# Patient Record
Sex: FEMALE | Employment: PART TIME | URBAN - METROPOLITAN AREA
[De-identification: names, ages, dates, MRNs, and addresses within clinical notes are randomized per-mention and may not be internally consistent; named-entity substitution may affect disease eponyms.]

---

## 2017-02-01 ENCOUNTER — OFFICE VISIT (OUTPATIENT)
Dept: BEHAVIORAL/MENTAL HEALTH CLINIC | Age: 62
End: 2017-02-01

## 2017-02-01 VITALS
HEIGHT: 60 IN | OXYGEN SATURATION: 96 % | DIASTOLIC BLOOD PRESSURE: 60 MMHG | HEART RATE: 55 BPM | WEIGHT: 180 LBS | BODY MASS INDEX: 35.34 KG/M2 | SYSTOLIC BLOOD PRESSURE: 110 MMHG

## 2017-02-01 DIAGNOSIS — F31.61 BIPOLAR DISORDER, CURRENT EPISODE MIXED, MILD (HCC): Primary | ICD-10-CM

## 2017-02-01 DIAGNOSIS — F41.1 GENERALIZED ANXIETY DISORDER: ICD-10-CM

## 2017-02-01 RX ORDER — LAMOTRIGINE 100 MG/1
TABLET ORAL
Qty: 30 TAB | Refills: 5 | Status: SHIPPED | OUTPATIENT
Start: 2017-02-01 | End: 2017-02-01 | Stop reason: SDUPTHER

## 2017-02-01 RX ORDER — SERTRALINE HYDROCHLORIDE 100 MG/1
100 TABLET, FILM COATED ORAL DAILY
Qty: 90 TAB | Refills: 1 | Status: SHIPPED | OUTPATIENT
Start: 2017-02-01 | End: 2017-08-02 | Stop reason: SDUPTHER

## 2017-02-01 RX ORDER — SERTRALINE HYDROCHLORIDE 100 MG/1
100 TABLET, FILM COATED ORAL DAILY
Qty: 30 TAB | Refills: 5 | Status: SHIPPED | OUTPATIENT
Start: 2017-02-01 | End: 2017-02-01 | Stop reason: SDUPTHER

## 2017-02-01 RX ORDER — LAMOTRIGINE 100 MG/1
TABLET ORAL
Qty: 90 TAB | Refills: 1 | Status: SHIPPED | OUTPATIENT
Start: 2017-02-01 | End: 2017-08-02 | Stop reason: SDUPTHER

## 2017-02-01 NOTE — PROGRESS NOTES
CHIEF COMPLAINT:  Abhinav Dalal is a 62 y.o. female and was seen today for follow-up of psychiatric condition and psychotropic medication management. HPI:     Abhinav Dalal is a 62 y.o. yo female who presents with symptoms of depression, anxiety and hypomania. She was originally diagnosed with BPAD and KATHLEEN but was on a med regimen that caused sig emotional blunting. Her Zoloft dose of 100mg every day was continued and her Lamictal dose was gradually decreased to 100mg every day, on which she has done well. FAMILY/SOCIAL HX: ***    REVIEW OF SYSTEMS:  Psychiatric:  {psych:43629::\"normal\"}  Appetite:{Psychappetite:26676}   Sleep: {Psychsleep:25159}   Neuro: ***    There were no vitals taken for this visit. Side Effects:  {Side effects:46645}    MENTAL STATUS EXAM:   Sensorium  {Sensorium:81050}   Relations {RELATIONS:50740261}   Appearance:  {APPEARANCE:43240::\"age appropriate\"}   Motor Behavior:  {MOTOR BEHAVIOR:35189955::\"within normal limits\"}   Speech:  {Findings; speech psych:97995}   Thought Process: {Psych thought process:65750}   Thought Content {thought content:83705}   Suicidal ideations {PLAN/INTENTION:31448423}   Homicidal ideations {PLAN/INTENTION:02987277}   Mood:  {Mood:00610}   Affect:  {Desc; affect:67298}   Memory recent  {MEMORY:05535586}   Memory remote:  {MEMORY:65933225}   Concentration:  {MEMORY:16011453}   Abstraction:  {ABSTRACTION:38020450}   Insight:  {Psych insight & judgement:40314}   Reliability {Reliability:44880}   Judgment:  {Psych insight & judgement:15935}     MEDICAL DECISION MAKING:  Problems addressed today:  {No Diagnosis Found}    Assessment:   Hallie Perez {AMB IS/IS UVZ:16373} responding to treatment, symptoms are ***. Her father came in from out of town and her roommate went to [place] unexpectedly. 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 If she gets it then she will only work 1 job.          Current Outpatient Prescriptions Medication Sig Dispense Refill    lamoTRIgine (LAMICTAL) 100 mg tablet TAKE ONE TABLET BY MOUTH ONCE DAILY 30 Tab 2    sertraline (ZOLOFT) 100 mg tablet Take 1 Tab by mouth daily. 30 Tab 2    MAGNESIUM CITRATE Take 1 Tab by mouth daily.  atenolol (TENORMIN) 100 mg tablet Take 100 mg by mouth daily.  LEVOTHYROXINE SODIUM (SYNTHROID PO) Take 50 mcg by mouth daily.  CYANOCOBALAMIN, VITAMIN B-12, (VITAMIN B-12 SL) by SubLINGual route.  cholecalciferol, vitamin D3, 2,000 unit tab Take 2,000 Units by mouth daily.  ASCORBATE CALCIUM (VITAMIN C PO) Take 1 Tab by mouth daily.  multivitamin (ONE A DAY) tablet Take 1 Tab by mouth daily.  BIOTIN PO Take 1 Tab by mouth daily. Plan:   1. Medications/ Labs: Continue Lamictal 100mg every day for mood stability. Continue Zoloft 100mg every day for depression. Rxs provided. 2.  Counseling and coordination of care including instructions for treatment, risks/benefits, risk factor reduction and patient/family education. She agrees with the plan. Patient instructed to call with any side effects, questions or issues.      3.  Follow-up Disposition: Not on File    2/1/2017  Mac Verma, FLAVIO

## 2017-02-01 NOTE — PROGRESS NOTES
CHIEF COMPLAINT:  Leyda Joe is a 62 y.o. female and was seen today for follow-up of psychiatric condition and psychotropic medication management. HPI:     Leyda Joe is a 62 y.o. yo female who presents with symptoms of depression, anxiety and hypomania. She was originally diagnosed with BPAD and KATHLEEN but was on a med regimen that caused sig emotional blunting. Her Zoloft dose of 100mg every day was continued and her Lamictal dose was gradually decreased to 100mg every day, on which she has done well. Visit Vitals    /60 (BP 1 Location: Left arm, BP Patient Position: Sitting)    Pulse (!) 55    Ht 5' (1.524 m)    Wt 81.6 kg (180 lb)    SpO2 96%    BMI 35.15 kg/m2       REVIEW OF SYSTEMS:  Psychiatric:  normal  Appetite:good, weight increased by 2 lbs   Sleep: good     Side Effects:  none    MENTAL STATUS EXAM:   Sensorium  oriented to time, place and person   Relations cooperative   Appearance:  age appropriate and casually dressed   Motor Behavior:  gait stable and within normal limits   Speech:  normal pitch, normal volume and non-pressured   Thought Process: goal directed and logical   Thought Content free of delusions, free of hallucinations and not internally preoccupied    Suicidal ideations none   Homicidal ideations none   Mood:  euthymic   Affect:  euthymic   Memory recent  adequate   Memory remote:  adequate   Concentration:  adequate   Abstraction:  abstract   Insight:  good   Reliability good   Judgment:  good     MEDICAL DECISION MAKING:  Problems addressed today:    ICD-10-CM ICD-9-CM    1. Bipolar disorder, current episode mixed, mild (HCC) F31.61 296.61    2. Generalized anxiety disorder F41.1 300.02        Assessment:   Bre Wilson is responding to treatment, symptoms are stable. Moods and anxiety are stable. Her father came in from out of town and her roommate went to [place] unexpectedly.  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 If she gets it then she will only work this 1 job as the pay is more and it is benefitted. She reports stable moods and anxiety. She is maintaining her friendships. No new issues. Current Outpatient Prescriptions   Medication Sig Dispense Refill    L-TYROSINE by Does Not Apply route.  sertraline (ZOLOFT) 100 mg tablet Take 1 Tab by mouth daily. 90 Tab 1    lamoTRIgine (LAMICTAL) 100 mg tablet TAKE ONE TABLET BY MOUTH ONCE DAILY 90 Tab 1    MAGNESIUM CITRATE Take 1 Tab by mouth daily.  atenolol (TENORMIN) 100 mg tablet Take 100 mg by mouth daily.  CYANOCOBALAMIN, VITAMIN B-12, (VITAMIN B-12 SL) by SubLINGual route.  cholecalciferol, vitamin D3, 2,000 unit tab Take 2,000 Units by mouth daily.  multivitamin (ONE A DAY) tablet Take 1 Tab by mouth daily.  BIOTIN PO Take 1 Tab by mouth daily. Plan:   1. Medications/Labs: Continue Lamictal 100mg every day for mood stability. Continue Zoloft 100mg every day for depression. Rxs provided. 2.  Counseling and coordination of care including instructions for treatment, risks/benefits, risk factor reduction and patient/family education. She agrees with the plan. Patient instructed to call with any side effects, questions or issues. 3.  Follow-up Disposition:  Return in about 6 months (around 8/1/2017).     Hayden Sylvester NP  2/1/2017

## 2017-03-21 ENCOUNTER — HOSPITAL ENCOUNTER (EMERGENCY)
Age: 62
Discharge: HOME | End: 2017-03-21
Attending: FAMILY MEDICINE

## 2017-03-21 VITALS
RESPIRATION RATE: 16 BRPM | DIASTOLIC BLOOD PRESSURE: 73 MMHG | BODY MASS INDEX: 33.61 KG/M2 | HEIGHT: 61 IN | SYSTOLIC BLOOD PRESSURE: 126 MMHG | HEART RATE: 73 BPM | TEMPERATURE: 98.7 F | OXYGEN SATURATION: 96 % | WEIGHT: 178 LBS

## 2017-03-21 DIAGNOSIS — J06.9 ACUTE UPPER RESPIRATORY INFECTION: Primary | ICD-10-CM

## 2017-03-21 RX ORDER — CODEINE PHOSPHATE AND GUAIFENESIN 10; 100 MG/5ML; MG/5ML
5 SOLUTION ORAL
Qty: 120 ML | Refills: 0 | Status: SHIPPED | OUTPATIENT
Start: 2017-03-21 | End: 2018-03-02 | Stop reason: ALTCHOICE

## 2017-03-21 NOTE — UC PROVIDER NOTE
Patient is a 62 y.o. female presenting with cough and nasal congestion. The history is provided by the patient. Cough   This is a new problem. Episode onset: Four days ago. The problem occurs every few minutes. The problem has not changed since onset. The cough is productive of sputum. There has been no fever. Associated symptoms include rhinorrhea. Pertinent negatives include no chest pain, no chills, no sweats, no headaches and no myalgias. She has tried antibiotics for the symptoms. The treatment provided no relief. She is not a smoker. Her past medical history does not include pneumonia or asthma. Nasal Congestion   This is a new problem. The current episode started more than 2 days ago. The problem occurs daily. The problem has not changed since onset. Pertinent negatives include no chest pain and no headaches. Nothing aggravates the symptoms. Nothing relieves the symptoms. Treatments tried: Antibiotics. Past Medical History:   Diagnosis Date    Depression     GERD (gastroesophageal reflux disease)     Hypercholesterolemia     Hypertension     Thyroid disease         History reviewed. No pertinent surgical history. History reviewed. No pertinent family history. Social History     Social History    Marital status: UNKNOWN     Spouse name: N/A    Number of children: N/A    Years of education: N/A     Occupational History    Not on file. Social History Main Topics    Smoking status: Never Smoker    Smokeless tobacco: Never Used    Alcohol use Yes      Comment: once a year    Drug use: No    Sexual activity: Not on file     Other Topics Concern    Not on file     Social History Narrative                ALLERGIES: Review of patient's allergies indicates no known allergies. Review of Systems   Constitutional: Negative for chills and fever. HENT: Positive for congestion and rhinorrhea. Respiratory: Positive for cough. Cardiovascular: Negative for chest pain. Musculoskeletal: Negative for myalgias. Neurological: Negative for headaches. Vitals:    03/21/17 1318   BP: 126/73   Pulse: 73   Resp: 16   Temp: 98.7 °F (37.1 °C)   SpO2: 96%   Weight: 80.7 kg (178 lb)   Height: 5' 1\" (1.549 m)       Physical Exam   Constitutional: She is oriented to person, place, and time. She appears well-developed and well-nourished. HENT:   Right Ear: External ear normal.   Left Ear: External ear normal.   Eyes: EOM are normal.   Neck: Normal range of motion. Neck supple. No JVD present. No tracheal deviation present. No thyromegaly present. Cardiovascular: Normal rate and regular rhythm. Pulmonary/Chest: Effort normal and breath sounds normal.   Abdominal: Soft. Bowel sounds are normal.   Musculoskeletal: Normal range of motion. Lymphadenopathy:     She has no cervical adenopathy. Neurological: She is alert and oriented to person, place, and time. Skin: Skin is warm and dry. Psychiatric: She has a normal mood and affect. Her behavior is normal. Judgment and thought content normal.   Nursing note and vitals reviewed. MDM     Differential Diagnosis; Clinical Impression; Plan:     CLINICAL IMPRESSION:  Acute upper respiratory infection  (primary encounter diagnosis)    Plan:  1. Robitussin AC  2.   3.   Risk of Significant Complications, Morbidity, and/or Mortality:   Presenting problems: Moderate  Diagnostic procedures: Moderate  Management options:   Moderate  Progress:   Patient progress:  Stable      Procedures

## 2017-03-21 NOTE — LETTER
[facility] 
[address] [address] 
[phone] Work/School Note Date: 3/21/2017 To Whom It May concern: 
 
Danna Leger was seen and treated today in the urgent care center by the following provider(s): 
Physician Assistant: Zack Gasca. Danna Leger may return to work on 3/21-3/24/17.  
 
Sincerely, 
 
 
 
 
Jamey Peck RN

## 2017-03-21 NOTE — DISCHARGE INSTRUCTIONS

## 2017-08-02 ENCOUNTER — OFFICE VISIT (OUTPATIENT)
Dept: BEHAVIORAL/MENTAL HEALTH CLINIC | Age: 62
End: 2017-08-02

## 2017-08-02 VITALS
BODY MASS INDEX: 33.42 KG/M2 | OXYGEN SATURATION: 98 % | HEART RATE: 56 BPM | SYSTOLIC BLOOD PRESSURE: 145 MMHG | HEIGHT: 61 IN | WEIGHT: 177 LBS | DIASTOLIC BLOOD PRESSURE: 77 MMHG

## 2017-08-02 DIAGNOSIS — F31.61 BIPOLAR DISORDER, CURRENT EPISODE MIXED, MILD (HCC): Primary | ICD-10-CM

## 2017-08-02 DIAGNOSIS — F41.1 GENERALIZED ANXIETY DISORDER: ICD-10-CM

## 2017-08-02 RX ORDER — SERTRALINE HYDROCHLORIDE 100 MG/1
100 TABLET, FILM COATED ORAL DAILY
Qty: 90 TAB | Refills: 1 | Status: SHIPPED | OUTPATIENT
Start: 2017-08-02 | End: 2018-03-02 | Stop reason: SDUPTHER

## 2017-08-02 RX ORDER — LAMOTRIGINE 100 MG/1
TABLET ORAL
Qty: 90 TAB | Refills: 1 | Status: SHIPPED | OUTPATIENT
Start: 2017-08-02 | End: 2018-03-02 | Stop reason: SDUPTHER

## 2017-08-02 NOTE — PROGRESS NOTES
CHIEF COMPLAINT:  Flaquito Vines is a 62 y.o. female and was seen today for follow-up of psychiatric condition and psychotropic medication management. HPI:    Flaquito Vines is a 62 y.o. yo female who presents with symptoms of depression, anxiety and hypomania. She was originally diagnosed with BPAD and KATLHEEN but was on a med regimen that caused sig emotional blunting. Her Zoloft dose of 100mg every day was continued and her Lamictal dose was gradually decreased to 100mg every day, on which she has done well. FAMILY/SOCIAL HX: lives with her roommate, works for [employer] and at [employer]	[place], has several dogs     REVIEW OF SYSTEMS:  Psychiatric:  normal  Appetite:no change from normal, weight decreased by 3 lbs   Sleep: good   Neuro: none reported     Visit Vitals    /77 (BP 1 Location: Left arm, BP Patient Position: Sitting)    Pulse (!) 56    Ht 5' 1\" (1.549 m)    Wt 80.3 kg (177 lb)    SpO2 98%    BMI 33.44 kg/m2       Side Effects:  none    MENTAL STATUS EXAM:   Sensorium  oriented to time, place and person   Relations cooperative   Appearance:  age appropriate and casually dressed   Motor Behavior:  gait stable and within normal limits   Speech:  normal pitch, normal volume and non-pressured   Thought Process: goal directed and logical   Thought Content free of delusions, free of hallucinations and not internally preoccupied    Suicidal ideations none   Homicidal ideations none   Mood:  euthymic   Affect:  euthymic   Memory recent  adequate   Memory remote:  adequate   Concentration:  adequate   Abstraction:  abstract   Insight:  good   Reliability good   Judgment:  good     MEDICAL DECISION MAKING:  Problems addressed today:    ICD-10-CM ICD-9-CM    1. Bipolar disorder, current episode mixed, mild ([facility]) F31.61 296.61    2. Generalized anxiety disorder F41.1 300.02        Assessment:   Edward Funk is responding to treatment, symptoms are stable.  Her aunt passed in [place] and a great-uncle passed. She reports that she is \"doing ok. \" She is working at [employer]. 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 Moods are stable with her current med regimen. She reports that she still has anger \"but it is like anyone else. \" She got a new pit bull mix and he is doing well. Current Outpatient Prescriptions   Medication Sig Dispense Refill    sertraline (ZOLOFT) 100 mg tablet Take 1 Tab by mouth daily. 90 Tab 1    lamoTRIgine (LAMICTAL) 100 mg tablet TAKE ONE TABLET BY MOUTH ONCE DAILY 90 Tab 1    guaiFENesin-codeine (ROBITUSSIN AC) 100-10 mg/5 mL solution Take 5 mL by mouth three (3) times daily as needed for Cough. Max Daily Amount: 15 mL. 120 mL 0    L-TYROSINE by Does Not Apply route.  MAGNESIUM CITRATE Take 1 Tab by mouth daily.  atenolol (TENORMIN) 100 mg tablet Take 100 mg by mouth daily.  CYANOCOBALAMIN, VITAMIN B-12, (VITAMIN B-12 SL) by SubLINGual route.  cholecalciferol, vitamin D3, 2,000 unit tab Take 2,000 Units by mouth daily.  multivitamin (ONE A DAY) tablet Take 1 Tab by mouth daily.  BIOTIN PO Take 1 Tab by mouth daily. Plan:   1. Medications/ Labs: Continue Lamictal 100mg every day for mood stability. Continue Zoloft 100mg every day for depression. Rxs provided. 2.  Counseling and coordination of care including instructions for treatment, risks/benefits, risk factor reduction and patient/family education. She agrees with the plan. Patient instructed to call with any side effects, questions or issues.      3.  Follow-up Disposition:  Return in about 6 months (around 2/2/2018).    8/2/2017  Bridger Minaya NP

## 2017-08-07 RX ORDER — SERTRALINE HYDROCHLORIDE 100 MG/1
TABLET, FILM COATED ORAL
Qty: 90 TAB | Refills: 1 | OUTPATIENT
Start: 2017-08-07

## 2018-03-02 ENCOUNTER — OFFICE VISIT (OUTPATIENT)
Dept: BEHAVIORAL/MENTAL HEALTH CLINIC | Age: 63
End: 2018-03-02

## 2018-03-02 VITALS
HEIGHT: 61 IN | HEART RATE: 57 BPM | SYSTOLIC BLOOD PRESSURE: 144 MMHG | BODY MASS INDEX: 34.36 KG/M2 | WEIGHT: 182 LBS | DIASTOLIC BLOOD PRESSURE: 73 MMHG

## 2018-03-02 DIAGNOSIS — F41.1 GENERALIZED ANXIETY DISORDER: ICD-10-CM

## 2018-03-02 DIAGNOSIS — F31.61 BIPOLAR DISORDER, CURRENT EPISODE MIXED, MILD (HCC): Primary | ICD-10-CM

## 2018-03-02 RX ORDER — LAMOTRIGINE 100 MG/1
TABLET ORAL
Qty: 90 TAB | Refills: 1 | Status: SHIPPED | OUTPATIENT
Start: 2018-03-02 | End: 2018-08-26 | Stop reason: SDUPTHER

## 2018-03-02 RX ORDER — LORAZEPAM 1 MG/1
TABLET ORAL
Qty: 30 TAB | Refills: 2 | Status: SHIPPED | OUTPATIENT
Start: 2018-03-02 | End: 2019-04-03 | Stop reason: SDUPTHER

## 2018-03-02 RX ORDER — ATENOLOL 50 MG/1
TABLET ORAL
Refills: 0 | COMMUNITY
Start: 2018-02-15

## 2018-03-02 RX ORDER — SERTRALINE HYDROCHLORIDE 100 MG/1
100 TABLET, FILM COATED ORAL DAILY
Qty: 90 TAB | Refills: 1 | Status: SHIPPED | OUTPATIENT
Start: 2018-03-02 | End: 2018-08-26 | Stop reason: SDUPTHER

## 2018-03-02 NOTE — PROGRESS NOTES
CHIEF COMPLAINT:  Julieth Nina is a 65 y.o. female and was seen today for follow-up of psychiatric condition and psychotropic medication management. Last appt was August 2017. HPI:    Julieth Nina is a 65 y.o. female who presents with symptoms of depression, anxiety and hypomania. She was originally diagnosed with BPAD and KATHLEEN but was on a med regimen that caused sig emotional blunting. Her Zoloft dose of 100mg every day was continued and her Lamictal dose was gradually decreased to 100mg every day, on which she has done well. FAMILY/SOCIAL HX: lives with her roommate, works for [employer] and at  Home	[employer], has several dogs     REVIEW OF SYSTEMS:  Psychiatric:  normal  Appetite:no change from normal, weight increased by 5lbs   Sleep: good   Neuro: none reported     Visit Vitals    /73    Pulse (!) 57    Ht 5' 1\" (1.549 m)    Wt 82.6 kg (182 lb)    BMI 34.39 kg/m2       Side Effects:  none    MENTAL STATUS EXAM:   Sensorium  oriented to time, place and person   Relations cooperative   Appearance:  age appropriate and casually dressed   Motor Behavior:  gait stable and within normal limits   Speech:  normal pitch, normal volume and non-pressured   Thought Process: goal directed and logical   Thought Content free of delusions, free of hallucinations and not internally preoccupied    Suicidal ideations none   Homicidal ideations none   Mood:  euthymic   Affect:  euthymic   Memory recent  adequate   Memory remote:  adequate   Concentration:  adequate   Abstraction:  abstract   Insight:  good   Reliability good   Judgment:  good     MEDICAL DECISION MAKING:  Problems addressed today:    ICD-10-CM ICD-9-CM    1. Bipolar disorder, current episode mixed, mild ([facility]) F31.61 296.61    2. Generalized anxiety disorder F41.1 300.02 LORazepam (ATIVAN) 1 mg tablet       Assessment:   Adolfo Senior is responding to treatment, symptoms are stable. She denies any new medical issues.  She will have to give up her dog as he was having agressive issues. She had an episode of depression in February when she isolated from others, called into work, and felt more depressed. She is doing better now. She is working at [employer]. Moods are generally stable with her current med regimen. She lost her insurance. She has noted a resurgence in her anxiety. She has a remote history of significant anxiety but this has improved. Now she notes infrequent episodes of intense anxiety with cold, clammy feeling. Ativan was helpful previously. She does not take narcotic pain medications. Current Outpatient Prescriptions   Medication Sig Dispense Refill    atenolol (TENORMIN) 50 mg tablet TAKE 2 TABLETS BY MOUTH EVERY DAY  0    lamoTRIgine (LAMICTAL) 100 mg tablet TAKE ONE TABLET BY MOUTH ONCE DAILY 90 Tab 1    sertraline (ZOLOFT) 100 mg tablet Take 1 Tab by mouth daily. 90 Tab 1    LORazepam (ATIVAN) 1 mg tablet Take 1/2 to 1 whole tablet daily PRN anxiety. 30 Tab 2    L-TYROSINE by Does Not Apply route.  CYANOCOBALAMIN, VITAMIN B-12, (VITAMIN B-12 SL) by SubLINGual route.  cholecalciferol, vitamin D3, 2,000 unit tab Take 2,000 Units by mouth daily.  multivitamin (ONE A DAY) tablet Take 1 Tab by mouth daily.  MAGNESIUM CITRATE Take 1 Tab by mouth daily.  BIOTIN PO Take 1 Tab by mouth daily. Plan:   1. Medications/ Labs: Continue Lamictal 100mg every day for mood stability. Continue Zoloft 100mg every day for depression. Start Ativan 0.5-1mg every day PRN severe anxiety. She agrees to use this sparingly. Risks/ benefits reviewed with her. Rxs provided. 2.  Counseling and coordination of care including instructions for treatment, risks/benefits, risk factor reduction and patient/family education. She agrees with the plan. Patient instructed to call with any side effects, questions or issues. 3.  Follow-up Disposition:  Return in about 6 months (around 9/2/2018).     3/2/2018  [facility] Jaime Queen, NP

## 2018-03-02 NOTE — MR AVS SNAPSHOT
[address] 
 
 
 [address] 
[phone] Patient: Brit Lackey MRN: [id number] ZULAY:[id number] Visit Information Date & Time Provider Department Dept. Phone Encounter #  
 3/2/2018 12:00 PM [facility] [phone] Your Appointments 9/5/2018  4:00 PM  
ESTABLISHED PATIENT with Herbert Chavarria NP [facility] ([facility]) Appt Note: 6 month follow-up [address] [facility] [address]  
  
   
 [address] [facility] [phone] Upcoming Health Maintenance Date Due Hepatitis C Screening 1955 DTaP/Tdap/Td series (1 - Tdap) 11/10/1977 PAP AKA CERVICAL CYTOLOGY 11/10/1977 BREAST CANCER SCRN MAMMOGRAM 11/10/2006 FOBT Q 1 YEAR AGE 50-75 11/10/2006 ZOSTER VACCINE AGE 60> 9/10/2016 Influenza Age 5 to Adult 8/1/2017 Allergies as of 3/2/2018  Review Complete On: 3/2/2018 By: Facundo Nuñez No Known Allergies Current Immunizations  Never Reviewed No immunizations on file. Not reviewed this visit You Were Diagnosed With   
  
 Codes Comments Bipolar disorder, current episode mixed, mild ([facility] [address]    -  Primary ICD-10-CM: F31.61 
ICD-9-CM: 296.61 Generalized anxiety disorder     ICD-10-CM: F41.1 ICD-9-CM: 300.02 Vitals BP Pulse Height(growth percentile) Weight(growth percentile) BMI OB Status 144/73 (!) 57 5' 1\" (1.549 m) 182 lb (82.6 kg) 34.39 kg/m2 Postmenopausal  
 Smoking Status Never Smoker Vitals History BMI and BSA Data Body Mass Index Body Surface Area  
 34.39 kg/m 2 1.89 m 2 Preferred Pharmacy Pharmacy Name Phone [id number] [address] [place] [address] [phone] Your Updated Medication List  
  
   
 This list is accurate as of 3/2/18 12:44 PM.  Always use your most recent med list.  
  
  
  
  
 atenolol 50 mg tablet Commonly known as:  TENORMIN  
TAKE 2 TABLETS BY MOUTH EVERY DAY  
  
 BIOTIN PO Take 1 Tab by mouth daily. cholecalciferol (vitamin D3) 2,000 unit Tab Take 2,000 Units by mouth daily. L-TYROSINE  
by Does Not Apply route. lamoTRIgine 100 mg tablet Commonly known as: LaMICtal  
TAKE ONE TABLET BY MOUTH ONCE DAILY LORazepam 1 mg tablet Commonly known as:  ATIVAN Take 1/2 to 1 whole tablet daily PRN anxiety. MAGNESIUM CITRATE Take 1 Tab by mouth daily. multivitamin tablet Commonly known as:  ONE A DAY Take 1 Tab by mouth daily. sertraline 100 mg tablet Commonly known as:  ZOLOFT Take 1 Tab by mouth daily. VITAMIN B-12 SL  
by SubLINGual route. Prescriptions Printed Refills  
 lamoTRIgine (LAMICTAL) 100 mg tablet 1 Sig: TAKE ONE TABLET BY MOUTH ONCE DAILY Class: Print  
 sertraline (ZOLOFT) 100 mg tablet 1 Sig: Take 1 Tab by mouth daily. Class: Print Route: Oral  
 LORazepam (ATIVAN) 1 mg tablet 2 Sig: Take 1/2 to 1 whole tablet daily PRN anxiety. Class: Print Introducing [facility]! Dru Lamb introduces [employer] System patient portal. Now you can access parts of your medical record, email your doctor's office, and request medication refills online. 1. In your internet browser, go to [email or web address] 2. Click on the First Time User? Click Here link in the Sign In box. You will see the New Member Sign Up page. 3. Enter your [employer] System Access Code exactly as it appears below. You will not need to use this code after youve completed the sign-up process. If you do not sign up before the expiration date, you must request a new code. · [employer] Print System Access Code: [email or web address] Expires: 5/31/2018 12:44 PM 
 
 4. Enter the last four digits of your Social Security Number (xxxx) and Date of Birth (mm/dd/yyyy) as indicated and click Submit. You will be taken to the next sign-up page. 5. Create a [employer] ID. This will be your [employer] login ID and cannot be changed, so think of one that is secure and easy to remember. 6. Create a [employer] password. You can change your password at any time. 7. Enter your Password Reset Question and Answer. This can be used at a later time if you forget your password. 8. Enter your e-mail address. You will receive e-mail notification when new information is available in [address] 9. Click Sign Up. You can now view and download portions of your medical record. 10. Click the Download Summary menu link to download a portable copy of your medical information. If you have questions, please visit the Frequently Asked Questions section of the [employer] website. Remember, [employer] is NOT to be used for urgent needs. For medical emergencies, dial 911. Now available from your iPhone and Android! Please provide this summary of care documentation to your next provider. Your primary care clinician is listed as Wen Hubbard. If you have any questions after today's visit, please call [phone].

## 2018-08-27 RX ORDER — SERTRALINE HYDROCHLORIDE 100 MG/1
TABLET, FILM COATED ORAL
Qty: 90 TAB | Refills: 0 | Status: SHIPPED | OUTPATIENT
Start: 2018-08-27 | End: 2018-09-05 | Stop reason: SDUPTHER

## 2018-08-27 RX ORDER — LAMOTRIGINE 100 MG/1
TABLET ORAL
Qty: 90 TAB | Refills: 0 | Status: SHIPPED | OUTPATIENT
Start: 2018-08-27 | End: 2018-09-05 | Stop reason: SDUPTHER

## 2018-09-05 ENCOUNTER — OFFICE VISIT (OUTPATIENT)
Dept: BEHAVIORAL/MENTAL HEALTH CLINIC | Age: 63
End: 2018-09-05

## 2018-09-05 VITALS
HEIGHT: 61 IN | WEIGHT: 180.2 LBS | BODY MASS INDEX: 34.02 KG/M2 | OXYGEN SATURATION: 97 % | HEART RATE: 62 BPM | SYSTOLIC BLOOD PRESSURE: 130 MMHG | RESPIRATION RATE: 16 BRPM | TEMPERATURE: 98.5 F | DIASTOLIC BLOOD PRESSURE: 65 MMHG

## 2018-09-05 DIAGNOSIS — F41.1 GENERALIZED ANXIETY DISORDER: ICD-10-CM

## 2018-09-05 DIAGNOSIS — F31.61 BIPOLAR DISORDER, CURRENT EPISODE MIXED, MILD (HCC): Primary | ICD-10-CM

## 2018-09-05 RX ORDER — LAMOTRIGINE 100 MG/1
TABLET ORAL
Qty: 90 TAB | Refills: 1 | Status: SHIPPED | OUTPATIENT
Start: 2018-09-05 | End: 2019-04-03 | Stop reason: SDUPTHER

## 2018-09-05 RX ORDER — SERTRALINE HYDROCHLORIDE 100 MG/1
TABLET, FILM COATED ORAL
Qty: 90 TAB | Refills: 1 | Status: SHIPPED | OUTPATIENT
Start: 2018-09-05 | End: 2019-04-03 | Stop reason: SDUPTHER

## 2018-09-05 NOTE — PROGRESS NOTES
CHIEF COMPLAINT:  Day Case is a 65 y.o. female and was seen today for follow-up of psychiatric condition and psychotropic medication management. Last appt was March 2017. HPI:    Day Case is a 65 y.o. female who presents with symptoms of depression, anxiety and hypomania. She was originally diagnosed with BPAD and KATHLEEN but was on a med regimen that caused sig emotional blunting. Her Zoloft dose of 100mg every day was continued and her Lamictal dose was gradually decreased to 100mg every day, on which she has done well. FAMILY/SOCIAL HX: lives with her roommate, works for [employer] and at  Home	[place], has several dogs     REVIEW OF SYSTEMS:  Psychiatric:  normal  Appetite: good, weight decreased by 2lbs   Sleep: good   Neuro: none reported     Visit Vitals    /65 (BP 1 Location: Left arm, BP Patient Position: Sitting)    Pulse 62    Temp 98.5 °F (36.9 °C) (Oral)    Resp 16    Ht 5' 1\" (1.549 m)    Wt 81.7 kg (180 lb 3.2 oz)    SpO2 97%    BMI 34.05 kg/m2       Side Effects:  none    MENTAL STATUS EXAM:   Sensorium  oriented to time, place and person   Relations cooperative   Appearance:  age appropriate and casually dressed   Motor Behavior:  gait stable and within normal limits   Speech:  normal pitch, normal volume and non-pressured   Thought Process: goal directed and logical   Thought Content free of delusions, free of hallucinations and not internally preoccupied    Suicidal ideations none   Homicidal ideations none   Mood:  euthymic   Affect:  euthymic   Memory recent  adequate   Memory remote:  adequate   Concentration:  adequate   Abstraction:  abstract   Insight:  good   Reliability good   Judgment:  good     MEDICAL DECISION MAKING:  Problems addressed today:    ICD-10-CM ICD-9-CM    1. Bipolar disorder, current episode mixed, mild (HCC) F31.61 296.61    2.  Generalized anxiety disorder F41.1 300.02        Assessment:   Traci Hough is responding to treatment, symptoms are stable. She denies any new medical issues. She is enjoying her job at [employer] and is also working 3 days a week at the [employer]. 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 Ativan is helping her panic symptoms- she rarely uses this but it works when she needs it to. Moods are still generally stable. Her friends recently lost their dog suddenly and so that was hard. Nat Nguyen has had some other stressors- taking a few dogs to the emergency vets. She started taking James Chi again every week. This is helpful for her anxiety. Overall, doing well and is stable. Current Outpatient Prescriptions   Medication Sig Dispense Refill    sertraline (ZOLOFT) 100 mg tablet TAKE 1 TABLET BY MOUTH EVERY DAY 90 Tab 1    lamoTRIgine (LAMICTAL) 100 mg tablet 1T;PO;QD; 90 Tab 1    atenolol (TENORMIN) 50 mg tablet TAKE 2 TABLETS BY MOUTH EVERY DAY  0    LORazepam (ATIVAN) 1 mg tablet Take 1/2 to 1 whole tablet daily PRN anxiety. 30 Tab 2    L-TYROSINE by Does Not Apply route.  cholecalciferol, vitamin D3, 2,000 unit tab Take 2,000 Units by mouth daily.  multivitamin (ONE A DAY) tablet Take 1 Tab by mouth daily.  BIOTIN PO Take 1 Tab by mouth daily.  MAGNESIUM CITRATE Take 1 Tab by mouth daily.  CYANOCOBALAMIN, VITAMIN B-12, (VITAMIN B-12 SL) by SubLINGual route. Plan:   1. Medications/ Labs: Continue Lamictal 100mg every day for mood stability. Continue Zoloft 100mg every day for depression. Continue Ativan 0.5-1mg every day PRN severe anxiety. She agrees to use this sparingly. Risks/ benefits reviewed with her. Rxs provided. 2.  Counseling and coordination of care including instructions for treatment, risks/benefits, risk factor reduction and patient/family education. She agrees with the plan. Patient instructed to call with any side effects, questions or issues.      3.  Follow-up Disposition:  Return in about 6 months (around 3/5/2019).    9/5/2018  Hiram Cline NP

## 2018-09-05 NOTE — PROGRESS NOTES
Claudia Kramer is a 65 y.o. female    1. Have you been to the ER, urgent care clinic since your last visit? Hospitalized since your last visit? No    2. Have you seen or consulted any other health care providers outside of the [address] [place] [address] since your last visit? Include any pap smears or colon screening.  No     Visit Vitals    /65 (BP 1 Location: Left arm, BP Patient Position: Sitting)    Pulse 62    Temp 98.5 °F (36.9 °C) (Oral)    Resp 16    Ht 5' 1\" (1.549 m)    Wt 81.7 kg (180 lb 3.2 oz)    SpO2 97%    BMI 34.05 kg/m2     Chris Howard LPN

## 2019-01-31 DIAGNOSIS — F41.1 GENERALIZED ANXIETY DISORDER: ICD-10-CM

## 2019-01-31 RX ORDER — LORAZEPAM 1 MG/1
TABLET ORAL
Qty: 30 TAB | Refills: 2 | OUTPATIENT
Start: 2019-01-31

## 2019-03-29 RX ORDER — LAMOTRIGINE 100 MG/1
TABLET ORAL
Qty: 90 TAB | Refills: 1 | OUTPATIENT
Start: 2019-03-29

## 2019-04-03 ENCOUNTER — OFFICE VISIT (OUTPATIENT)
Dept: BEHAVIORAL/MENTAL HEALTH CLINIC | Age: 64
End: 2019-04-03

## 2019-04-03 VITALS
WEIGHT: 177.4 LBS | DIASTOLIC BLOOD PRESSURE: 80 MMHG | HEIGHT: 61 IN | BODY MASS INDEX: 33.49 KG/M2 | SYSTOLIC BLOOD PRESSURE: 131 MMHG | HEART RATE: 50 BPM

## 2019-04-03 DIAGNOSIS — F41.1 GENERALIZED ANXIETY DISORDER: ICD-10-CM

## 2019-04-03 DIAGNOSIS — F31.76 BIPOLAR DISORDER, IN FULL REMISSION, MOST RECENT EPISODE DEPRESSED (HCC): Primary | ICD-10-CM

## 2019-04-03 DIAGNOSIS — F31.81 BIPOLAR II DISORDER (HCC): ICD-10-CM

## 2019-04-03 RX ORDER — LAMOTRIGINE 100 MG/1
TABLET ORAL
Qty: 90 TAB | Refills: 0 | Status: SHIPPED | OUTPATIENT
Start: 2019-04-03 | End: 2019-06-26 | Stop reason: SDUPTHER

## 2019-04-03 RX ORDER — SERTRALINE HYDROCHLORIDE 100 MG/1
TABLET, FILM COATED ORAL
Qty: 90 TAB | Refills: 0 | Status: SHIPPED | OUTPATIENT
Start: 2019-04-03 | End: 2019-08-31 | Stop reason: SDUPTHER

## 2019-04-03 RX ORDER — LORAZEPAM 1 MG/1
TABLET ORAL
Qty: 30 TAB | Refills: 0 | Status: SHIPPED | OUTPATIENT
Start: 2019-04-03 | End: 2019-09-30 | Stop reason: SDUPTHER

## 2019-04-03 NOTE — PROGRESS NOTES
CHIEF COMPLAINT:  Magda Pennington is a 59 y.o.   female and was seen today for follow-up of psychiatric condition and psychotropic medication management. Received treatment from Gilberto Lane NP since 07/2016 . ...... Adela Page Patient was transferred as her previous provider Gilberto Lane NP has  left the practice. Last office visit was 09/2018. HPI:  History and HPI Copied from Nuris's initial visit note and addended    Magda Pennington is a 59 y.o. female who presents with symptoms of depression, anxiety and hypomania. She was originally diagnosed with BPAD and KATHLEEN but was on a med regimen that caused sig emotional blunting. Magda Pennington is a 59 y.o.  female who presents with symptoms of depression, anxiety and hypomania. She was originally diagnosed with BPAD and KATHLEEN at [place] [address] approximately 8-10 years ago and she was trialed on numerous medications including Lithium, Effexor, Ambien, and Wellbutrin, however she has been on the combo of Zoloft 100mg every day and Lamictal 200mg every day for many years and she finds it effective. She is, however, concerned about feeling too emotionally blunted and states that when her mother passed 2-3 years ago, she was unable to feel much of any emotion. Darryle Luna believes that her dosages on the medications are too high and she is seeking an adjustment. She reports a history of high anxiety occuring daily when she feels very frustrated and intense anger and has even kicked in a door. She feels more restless and distracted at these times. She states that the Zoloft helps her anxiety significantly and she noticed a resurgence in her anxiety and anger when she decided to stop taking the medications for one week. Darryle Luna also reports mood swings and feels depressed during the fall/ winter months.  During these times she isolates, calls into work, is unmotivated and has low energy, feels guilty, has a labile appetite, oversleeps, and has difficulty making decisions. She also has times throughout the year when she feels too \"hyper\" and feels overly distracted and people say that she speaks very loudly and \"sharply. \" She states that, at these time, she is overly happy and her partner has wondered if she is on drugs. She has a history of excessive spending and has incurred significant debt to the point that her father has had to help her financially, however she is unsure if this behavior follows a mood pattern. She denies problems with reckless driving, promiscuity, gambling, and drugs or alcohol. She denies psychosis, OCD, and SI/HI or history of suicide attempts.    Her Zoloft dose of 100mg every day was continued and her Lamictal dose was gradually decreased to 100mg every day, By Masha Farley NP on which she has done well. Past Psychiatric History:  Meds: Current: Lamictal 200mg every day- helpful and she has been on it for approximately 6-7 years                           Zoloft 100mg every day- very helpful (has been on for approximately 6-7 years and was started around the same time as the Lamictal)             Past: Effexor- she is unsure why this med was changed                      Wellbutrin- tingling in fingers and hands                      Lithium- unsure why she was taken off of this med                      Ambien- became dependent  Outpt Treatment: Current: denies                               Past: was first seen for therapy at [facility] 5-6 years ago for a few sessions, around the same time she was also seen at [facility] for about 2 years   Past Hospitalizations: denies  Suicide attempts? no  Family hx of suicide? NO  Self injurious behaviors: denies         Family History: maternal cousin with multiple suicide attempts, maternal cousin with substance abuse, mother with Alzheimer's disease        Social History:  Family Dynamics: Raised in [address] [place] [address] and was an only child.  She reports a happy childhood and had a lot of support. She was close with both of her parents. Her mother passed 2-3 years ago and her father still resides in the same town in [place]. Fabian Davila has never been  and has no kids, however she likes to work with dogs and has a greyhound. She has a female partner and they have a good social [place]. 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  Sexual:  unknown sexual history       REVIEW OF SYSTEMS:  Psychiatric:  normal  Appetite: good, weight decreased by 2lbs   Sleep: good   Neuro: none reported     Visit Vitals  /80   Pulse (!) 50   Ht 5' 1\" (1.549 m)   Wt 80.5 kg (177 lb 6.4 oz)   BMI 33.52 kg/m²       Side Effects:  none    MENTAL STATUS EXAM:   Sensorium  oriented to time, place and person   Relations cooperative   Appearance:  age appropriate and casually dressed   Motor Behavior:  gait stable and within normal limits   Speech:  normal pitch, normal volume and non-pressured   Thought Process: goal directed and logical   Thought Content free of delusions, free of hallucinations and not internally preoccupied    Suicidal ideations none   Homicidal ideations none   Mood:  euthymic   Affect:  euthymic   Memory recent  adequate   Memory remote:  adequate   Concentration:  adequate   Abstraction:  abstract   Insight:  good   Reliability good   Judgment:  good     MEDICAL DECISION MAKING:  Problems addressed today:  Bipolar disorder, in full remission, most recent episode depressed ([facility] F31.76 296.56      2.  Generalized anxiety disorder F41.1 300.02          Assessment:   Fabian Davila is responding to treatment, symptoms are stable. She denies any new medical issues. Has stressors- father has basal cell cancer on leg, she is looking for new job. Reported sleeping fitful. Reported her mood is stable, her appetite is fair, has interest, has motivation and able to focus and concentrate. She denied any hopelessness or helplessness or passive suicide thoughts. Denied nay symptoms of nora or psychosis in past few years. Reported has manic episode 7 -8 years ago. Denied any suicide attempt in her life time. Denied nay symptoms of PTSD. denied nay social anxiety. She is enjoying her job at [employer] and an is receiving social security. Reported takes lorazepam prn rarely for  Panic attacks. Takes rarely once a month. she rarely uses this but it works when she needs it to. Moods are still generally stable. This is helpful for her anxiety. Overall, doing well and is stable. Reviewed labs. Patient denies SI/HI/SIB. No evidence of AH/VH or delusions. Client is responding to treatment and is tolerating treatment well. Psychoeducation, medication teaching, co-morbid illness and pertinent health factors to manage care were discussed. Overall, patient is stable at this time and will require ongoing medication management. Possible organic causes contributing are: HT, seasonal allergies, carpel gaurang  Reviewed medical admissions and discussed with the patient. Client is medically stable. Vitals stable  Risk Scoring- chronic illnesses and prescription drug management      Current Outpatient Medications   Medication Sig Dispense Refill    sertraline (ZOLOFT) 100 mg tablet TAKE 1 TABLET BY MOUTH EVERY DAY 90 Tab 1    lamoTRIgine (LAMICTAL) 100 mg tablet 1T;PO;QD; 90 Tab 1    atenolol (TENORMIN) 50 mg tablet TAKE 2 TABLETS BY MOUTH EVERY DAY  0    L-TYROSINE Take  by mouth daily.  CYANOCOBALAMIN, VITAMIN B-12, (VITAMIN B-12 SL) by SubLINGual route daily.       cholecalciferol, vitamin D3, 2,000 unit tab Take 2,000 Units by mouth daily.  multivitamin (ONE A DAY) tablet Take 1 Tab by mouth daily.  BIOTIN PO Take 1 Tab by mouth daily.  LORazepam (ATIVAN) 1 mg tablet Take 1/2 to 1 whole tablet daily PRN anxiety. 30 Tab 2    MAGNESIUM CITRATE Take 1 Tab by mouth daily. Plan:   1. Medications/ Labs: Continue Lamictal 100mg every day for mood stability. Continue Zoloft 100mg every day for depression. Continue Ativan 0.5-1mg every day PRN severe anxiety- gave 30 tabs. She agrees to use this sparingly. Risks/ benefits reviewed with her. Rxs provided. Labs ordered- CBC, BMP, TSH  2. Counseling and coordination of care including instructions for treatment, risks/benefits, risk factor reduction and patient/family education. She agrees with the plan. Patient instructed to call with any side effects, questions or issues. 3.    Follow-up and Dispositions    · Return in about 3 months (around 7/3/2019) for med check and follow up.          4/3/2019  Elizabeth Pringle NP

## 2019-09-25 DIAGNOSIS — F31.76 BIPOLAR DISORDER, IN FULL REMISSION, MOST RECENT EPISODE DEPRESSED (HCC): ICD-10-CM

## 2019-09-25 RX ORDER — LAMOTRIGINE 100 MG/1
TABLET ORAL
Qty: 30 TAB | Refills: 0 | Status: SHIPPED | OUTPATIENT
Start: 2019-09-25 | End: 2019-09-30 | Stop reason: SDUPTHER

## 2019-09-30 ENCOUNTER — OFFICE VISIT (OUTPATIENT)
Dept: BEHAVIORAL/MENTAL HEALTH CLINIC | Age: 64
End: 2019-09-30

## 2019-09-30 VITALS
DIASTOLIC BLOOD PRESSURE: 70 MMHG | TEMPERATURE: 98.7 F | BODY MASS INDEX: 34.65 KG/M2 | SYSTOLIC BLOOD PRESSURE: 115 MMHG | HEART RATE: 60 BPM | WEIGHT: 183.4 LBS

## 2019-09-30 DIAGNOSIS — F31.76 BIPOLAR DISORDER, IN FULL REMISSION, MOST RECENT EPISODE DEPRESSED (HCC): Primary | ICD-10-CM

## 2019-09-30 DIAGNOSIS — F41.1 GENERALIZED ANXIETY DISORDER: ICD-10-CM

## 2019-09-30 DIAGNOSIS — F41.0 PANIC ATTACKS: ICD-10-CM

## 2019-09-30 RX ORDER — LAMOTRIGINE 100 MG/1
TABLET ORAL
Qty: 30 TAB | Refills: 1 | Status: SHIPPED | OUTPATIENT
Start: 2019-09-30 | End: 2019-10-28 | Stop reason: SDUPTHER

## 2019-09-30 RX ORDER — LORAZEPAM 1 MG/1
TABLET ORAL
Qty: 30 TAB | Refills: 1 | Status: SHIPPED | OUTPATIENT
Start: 2019-09-30 | End: 2020-01-07 | Stop reason: SDUPTHER

## 2019-09-30 RX ORDER — SERTRALINE HYDROCHLORIDE 100 MG/1
150 TABLET, FILM COATED ORAL DAILY
Qty: 45 TAB | Refills: 1 | Status: SHIPPED | OUTPATIENT
Start: 2019-09-30 | End: 2019-10-03 | Stop reason: SDUPTHER

## 2019-09-30 NOTE — PROGRESS NOTES
CHIEF COMPLAINT:  Darling Bates is a 59 y.o.   female and was seen today for follow-up of psychiatric condition and psychotropic medication management. Received treatment from [place], NP since 07/2016 . ...... Amita Young Patient was transferred as her previous provider FLAVIO Breen has  left the practice. Last office visit was 09/2018. HPI:  History and HPI Copied from Nuris's initial visit note and addended    Darling Bates is a 59 y.o. female who presents with symptoms of depression, anxiety and hypomania. She was originally diagnosed with BPAD and KATHLEEN but was on a med regimen that caused sig emotional blunting. Darling Bates is a 59 y.o.  female who presents with symptoms of depression, anxiety and hypomania. She was originally diagnosed with BPAD and KATHLEEN at [place] [address] approximately 8-10 years ago and she was trialed on numerous medications including Lithium, Effexor, Ambien, and Wellbutrin, however she has been on the combo of Zoloft 100mg every day and Lamictal 200mg every day for many years and she finds it effective. She is, however, concerned about feeling too emotionally blunted and states that when her mother passed 2-3 years ago, she was unable to feel much of any emotion. Timmy Ramirez believes that her dosages on the medications are too high and she is seeking an adjustment. She reports a history of high anxiety occuring daily when she feels very frustrated and intense anger and has even kicked in a door. She feels more restless and distracted at these times. She states that the Zoloft helps her anxiety significantly and she noticed a resurgence in her anxiety and anger when she decided to stop taking the medications for one week. Timmy Ramirez also reports mood swings and feels depressed during the fall/ winter months.  During these times she isolates, calls into work, is unmotivated and has low energy, feels guilty, has a labile appetite, oversleeps, and has difficulty making decisions. She also has times throughout the year when she feels too \"hyper\" and feels overly distracted and people say that she speaks very loudly and \"sharply. \" She states that, at these time, she is overly happy and her partner has wondered if she is on drugs. She has a history of excessive spending and has incurred significant debt to the point that her father has had to help her financially, however she is unsure if this behavior follows a mood pattern. She denies problems with reckless driving, promiscuity, gambling, and drugs or alcohol. She denies psychosis, OCD, and SI/HI or history of suicide attempts.    Her Zoloft dose of 100mg every day was continued and her Lamictal dose was gradually decreased to 100mg every day, By Andrea Banks NP on which she has done well. Reported has manic episode 7 -8 years ago. Denied any suicide attempt in her life time. Denied nay symptoms of PTSD. denied nay social anxiety. Past Psychiatric History:  Meds: Current: Lamictal 200mg every day- helpful and she has been on it for approximately 6-7 years                           Zoloft 100mg every day- very helpful (has been on for approximately 6-7 years and was started around the same time as the Lamictal)             Past: Effexor- she is unsure why this med was changed                      Wellbutrin- tingling in fingers and hands                      Lithium- unsure why she was taken off of this med                      Ambien- became dependent  Outpt Treatment: Current: denies                               Past: was first seen for therapy at [facility] 5-6 years ago for a few sessions, around the same time she was also seen at [place] [address] for about 2 years   Past Hospitalizations: denies  Suicide attempts? no  Family hx of suicide?  NO  Self injurious behaviors: denies         Family History: maternal cousin with multiple suicide attempts, maternal cousin with substance abuse, mother with Alzheimer's disease        Social History:  Family Dynamics: Raised in [address] [place] [address] and was an only child. She reports a happy childhood and had a lot of support. She was close with both of her parents. Her mother passed 2-3 years ago and her father still resides in the same town in [place]. Ramon Medellin has never been  and has no kids, however she likes to work with dogs and has a greyhound. She has a female partner and they have a good social [place].    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  Sexual:  unknown sexual history       REVIEW OF SYSTEMS:  Psychiatric:  normal  Appetite: good, weight decreased by 2lbs   Sleep: good   Neuro: none reported     Visit Vitals  /70   Pulse 60   Temp 98.7 °F (37.1 °C)   Wt 83.2 kg (183 lb 6.4 oz)   BMI 34.65 kg/m²       Side Effects:  none    MENTAL STATUS EXAM:   Sensorium  oriented to time, place and person   Relations cooperative   Appearance:  age appropriate and casually dressed   Motor Behavior:  gait stable and within normal limits   Speech:  normal pitch, normal volume and non-pressured   Thought Process: goal directed and logical   Thought Content free of delusions, free of hallucinations and not internally preoccupied    Suicidal ideations none   Homicidal ideations none   Mood:  anxious   Affect:  Anxious and mood congruent   Memory recent  adequate   Memory remote:  adequate   Concentration:  adequate   Abstraction:  abstract   Insight:  good   Reliability good   Judgment:  good     MEDICAL DECISION MAKING:  Problems addressed today:    Bipolar disorder, in full remission, most recent episode depressed ([facility] Generalized anxiety disorderpanic attacks    Assessment: reviewed : appropriate use. filled lorazepam in April 2019  Christofer Kang is responding to treatment, symptoms are stable. She denies any new medical issues. Has stressors- father has basal cell cancer on leg and has memory issues and lost way. She is worried about him. Today she reported she is sleeping fitful. Reported she is taking lorazepam daily now a days. Reported worsening of anxiety related to father's illness. Reported her mood is anxious, her appetite is good and is eating more due to stressors. air, has interest, has motivation and able to focus and concentrate. She denied any hopelessness or helplessness or passive suicide thoughts. Denied any symptoms of nora or psychosis in past few years. 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 Reported takes lorazepam for  Panic attacks. . Took lorazepam this morning. Plan to increase the dose of sertraline to target anxiety. Reviewed labs. Patient denies SI/HI/SIB. No evidence of AH/VH or delusions. Client is responding to treatment and is tolerating treatment well. Psychoeducation, medication teaching, co-morbid illness and pertinent health factors to manage care were discussed. Overall, patient is unstable at this time and will require ongoing medication management. Possible organic causes contributing are: HT, seasonal allergies, carpel gaurang  Reviewed medical admissions and discussed with the patient. Client is medically stable.  Vitals stable  Risk Scoring- chronic illnesses and prescription drug management      Current Outpatient Medications   Medication Sig Dispense Refill    lamoTRIgine (LAMICTAL) 100 mg tablet TAKE 1 TABLET BY MOUTH EVERY DAY  Indications: Bipolar Disorder in Remission 30 Tab 1    LORazepam (ATIVAN) 1 mg tablet Take 1/2 to 1 whole tablet daily PRN anxiety. 30 Tab 1    sertraline (ZOLOFT) 100 mg tablet Take 1.5 Tabs by mouth daily. 45 Tab 1    atenolol (TENORMIN) 50 mg tablet TAKE 2 TABLETS BY MOUTH EVERY DAY  0    L-TYROSINE Take  by mouth daily.  CYANOCOBALAMIN, VITAMIN B-12, (VITAMIN B-12 SL) by SubLINGual route daily.  cholecalciferol, vitamin D3, 2,000 unit tab Take 2,000 Units by mouth daily.  multivitamin (ONE A DAY) tablet Take 1 Tab by mouth daily.  BIOTIN PO Take 1 Tab by mouth daily.  MAGNESIUM CITRATE Take 1 Tab by mouth daily. Plan:   1. Medications/ Labs: Continue Lamictal 100mg every day for mood stability. Continue Zoloft 150mg every day for depression. Continue Ativan 0.5-1mg every day PRN severe anxiety- gave 30 tabs. She agrees to use this sparingly. Risks/ benefits reviewed with her. Rxs provided. Labs ordered- CBC, BMP, TSH, UDS  2. Counseling and coordination of care including instructions for treatment, risks/benefits, risk factor reduction and patient/family education. She agrees with the plan. Patient instructed to call with any side effects, questions or issues. PSYCHOTHERAPY:  approx 16 minutes  Type:  Supportive/Cognitive Behavioral psychotherapy provided  Focus:     Current problems- father is sick    Medical issues- HT     Education : Exercise- walk 20 minutes daily                   obesity,                    Psychoeducation provided  Treatment plan reviewed with patient-including diagnosis and medications    3.     Follow-up and Dispositions    · Return in about 2 months (around 11/30/2019) for med check and follow up.         9/30/2019  Sharifa Forte NP

## 2019-10-03 DIAGNOSIS — F31.76 BIPOLAR DISORDER, IN FULL REMISSION, MOST RECENT EPISODE DEPRESSED (HCC): ICD-10-CM

## 2019-10-03 RX ORDER — SERTRALINE HYDROCHLORIDE 100 MG/1
150 TABLET, FILM COATED ORAL DAILY
Qty: 45 TAB | Refills: 1 | Status: SHIPPED | OUTPATIENT
Start: 2019-10-03 | End: 2019-11-05 | Stop reason: SDUPTHER

## 2019-10-10 LAB
ALBUMIN SERPL-MCNC: 4.1 G/DL (ref 3.6–4.8)
ALBUMIN/GLOB SERPL: 1.4 {RATIO} (ref 1.2–2.2)
ALP SERPL-CCNC: 70 IU/L (ref 39–117)
ALT SERPL-CCNC: 36 IU/L (ref 0–32)
AST SERPL-CCNC: 47 IU/L (ref 0–40)
BASOPHILS # BLD AUTO: 0.1 X10E3/UL (ref 0–0.2)
BASOPHILS NFR BLD AUTO: 1 %
BILIRUB SERPL-MCNC: 0.3 MG/DL (ref 0–1.2)
BUN SERPL-MCNC: 10 MG/DL (ref 8–27)
BUN/CREAT SERPL: 10 (ref 12–28)
CALCIUM SERPL-MCNC: 9.7 MG/DL (ref 8.7–10.3)
CHLORIDE SERPL-SCNC: 104 MMOL/L (ref 96–106)
CO2 SERPL-SCNC: 22 MMOL/L (ref 20–29)
CREAT SERPL-MCNC: 0.99 MG/DL (ref 0.57–1)
DRUGS UR: NORMAL
EOSINOPHIL # BLD AUTO: 0.3 X10E3/UL (ref 0–0.4)
EOSINOPHIL NFR BLD AUTO: 6 %
ERYTHROCYTE [DISTWIDTH] IN BLOOD BY AUTOMATED COUNT: 13 % (ref 12.3–15.4)
GLOBULIN SER CALC-MCNC: 3 G/DL (ref 1.5–4.5)
GLUCOSE SERPL-MCNC: 85 MG/DL (ref 65–99)
HCT VFR BLD AUTO: 46 % (ref 34–46.6)
HGB BLD-MCNC: 15.5 G/DL (ref 11.1–15.9)
IMM GRANULOCYTES # BLD AUTO: 0 X10E3/UL (ref 0–0.1)
IMM GRANULOCYTES NFR BLD AUTO: 0 %
LYMPHOCYTES # BLD AUTO: 2 X10E3/UL (ref 0.7–3.1)
LYMPHOCYTES NFR BLD AUTO: 33 %
MCH RBC QN AUTO: 34.1 PG (ref 26.6–33)
MCHC RBC AUTO-ENTMCNC: 33.7 G/DL (ref 31.5–35.7)
MCV RBC AUTO: 101 FL (ref 79–97)
MONOCYTES # BLD AUTO: 0.6 X10E3/UL (ref 0.1–0.9)
MONOCYTES NFR BLD AUTO: 9 %
NEUTROPHILS # BLD AUTO: 3.1 X10E3/UL (ref 1.4–7)
NEUTROPHILS NFR BLD AUTO: 51 %
PLATELET # BLD AUTO: 190 X10E3/UL (ref 150–450)
POTASSIUM SERPL-SCNC: 4.4 MMOL/L (ref 3.5–5.2)
PROT SERPL-MCNC: 7.1 G/DL (ref 6–8.5)
RBC # BLD AUTO: 4.55 X10E6/UL (ref 3.77–5.28)
SODIUM SERPL-SCNC: 142 MMOL/L (ref 134–144)
TSH SERPL DL<=0.005 MIU/L-ACNC: 3.63 UIU/ML (ref 0.45–4.5)
WBC # BLD AUTO: 6 X10E3/UL (ref 3.4–10.8)

## 2020-01-07 ENCOUNTER — OFFICE VISIT (OUTPATIENT)
Dept: BEHAVIORAL/MENTAL HEALTH CLINIC | Age: 65
End: 2020-01-07

## 2020-01-07 VITALS
WEIGHT: 180 LBS | BODY MASS INDEX: 33.99 KG/M2 | HEART RATE: 60 BPM | HEIGHT: 61 IN | SYSTOLIC BLOOD PRESSURE: 121 MMHG | DIASTOLIC BLOOD PRESSURE: 69 MMHG

## 2020-01-07 DIAGNOSIS — F41.0 PANIC ATTACKS: ICD-10-CM

## 2020-01-07 DIAGNOSIS — F41.1 GENERALIZED ANXIETY DISORDER: ICD-10-CM

## 2020-01-07 DIAGNOSIS — F31.76 BIPOLAR DISORDER, IN FULL REMISSION, MOST RECENT EPISODE DEPRESSED (HCC): ICD-10-CM

## 2020-01-07 RX ORDER — LAMOTRIGINE 100 MG/1
TABLET ORAL
Qty: 90 TAB | Refills: 1 | Status: SHIPPED | OUTPATIENT
Start: 2020-01-07

## 2020-01-07 RX ORDER — SERTRALINE HYDROCHLORIDE 100 MG/1
150 TABLET, FILM COATED ORAL DAILY
Qty: 135 TAB | Refills: 1 | Status: SHIPPED | OUTPATIENT
Start: 2020-01-07

## 2020-01-07 RX ORDER — LORAZEPAM 1 MG/1
TABLET ORAL
Qty: 30 TAB | Refills: 5 | Status: SHIPPED | OUTPATIENT
Start: 2020-01-07

## 2020-01-07 NOTE — PROGRESS NOTES
Psychiatric Outpatient Progress Note    Account Number:  @MRN@  Name: [unfilled]    CHIEF COMPLAINT:  Frances Noav is a 62 y.o.   female and was seen today for follow-up of psychiatric condition and psychotropic medication management. Last office visit was 09/2018. HPI:     Frances Nova is a 59 y.o. female who presents with symptoms of depression, anxiety and hypomania. She was originally diagnosed with BPAD and KATHLEEN but was on a med regimen that caused sig emotional blunting. Frances Nova is a 59 y.o.  female who presents with symptoms of depression, anxiety and hypomania. She was originally diagnosed with BPAD and KATHLEEN at [facility] approximately 8-10 years ago and she was trialed on numerous medications including Lithium, Effexor, Ambien, and Wellbutrin, however she has been on the combo of Zoloft 100mg every day and Lamictal 200mg every day for many years and she finds it effective. She is, however, concerned about feeling too emotionally blunted and states that when her mother passed 2-3 years ago, she was unable to feel much of any emotion. Shiva Perez believes that her dosages on the medications are too high and she is seeking an adjustment. She reports a history of high anxiety occuring daily when she feels very frustrated and intense anger and has even kicked in a door. She feels more restless and distracted at these times. She states that the Zoloft helps her anxiety significantly and she noticed a resurgence in her anxiety and anger when she decided to stop taking the medications for one week. Shiva Perez also reports mood swings and feels depressed during the fall/ winter months. During these times she isolates, calls into work, is unmotivated and has low energy, feels guilty, has a labile appetite, oversleeps, and has difficulty making decisions.  She also has times throughout the year when she feels too \"hyper\" and feels overly distracted and people say that she speaks very loudly and \"sharply. \" She states that, at these time, she is overly happy and her partner has wondered if she is on drugs. She has a history of excessive spending and has incurred significant debt to the point that her father has had to help her financially, however she is unsure if this behavior follows a mood pattern. She denies problems with reckless driving, promiscuity, gambling, and drugs or alcohol. She denies psychosis, OCD, and SI/HI or history of suicide attempts.    Her Zoloft dose of 100mg every day was continued and her Lamictal dose was gradually decreased to 100mg every day, By Domenico Gibbs NP on which she has done well. Reported has manic episode 7 -8 years ago. Denied any suicide attempt in her life time. Denied nay symptoms of PTSD. denied nay social anxiety. Past Psychiatric History:  Meds: Current: Lamictal 200mg every day- helpful and she has been on it for approximately 6-7 years                           Zoloft 100mg every day- very helpful (has been on for approximately 6-7 years and was started around the same time as the Lamictal)             Past: Effexor- she is unsure why this med was changed                      Wellbutrin- tingling in fingers and hands                      Lithium- unsure why she was taken off of this med                      Ambien- became dependent  Outpt Treatment: Current: denies                               Past: was first seen for therapy at [facility] 5-6 years ago for a few sessions, around the same time she was also seen at [place] [id number] for about 2 years   Past Hospitalizations: denies  Suicide attempts? no  Family hx of suicide? NO  Self injurious behaviors: denies         Family History: maternal cousin with multiple suicide attempts, maternal cousin with substance abuse, mother with Alzheimer's disease        Social History:  Family Dynamics: Raised in [address] [place] [address] and was an only child.  She reports a happy childhood and had a lot of support. She was close with both of her parents. Her mother passed 2-3 years ago and her father still resides in the same town in [place]. Ailyn Steward has never been  and has no kids, however she likes to work with dogs and has a greyhound. She has a female partner and they have a good social [place].    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  Sexual:  unknown sexual history       REVIEW OF SYSTEMS:  Psychiatric:  normal  Appetite: good, weight decreased by 2lbs   Sleep: good   Neuro: none reported     Visit Vitals  /69 (BP 1 Location: Left arm, BP Patient Position: Sitting)   Pulse 60   Ht 5' 1\" (1.549 m)   Wt 81.6 kg (180 lb)   BMI 34.01 kg/m²       Side Effects:  none    MENTAL STATUS EXAM:   Sensorium  oriented to time, place and person   Relations cooperative   Appearance:  age appropriate and casually dressed   Motor Behavior:  gait stable and within normal limits   Speech:  normal pitch, normal volume and non-pressured   Thought Process: goal directed and logical   Thought Content free of delusions, free of hallucinations and not internally preoccupied    Suicidal ideations none   Homicidal ideations none   Mood:  anxious   Affect:  Anxious and mood congruent   Memory recent  adequate   Memory remote:  adequate   Concentration:  adequate   Abstraction:  abstract   Insight:  good   Reliability good   Judgment:  good     MEDICAL DECISION MAKING:  Problems addressed today:                   Bipolar disorder, in full remission, most recent episode depressed ([facility] [address] Generalized anxiety disorderpanic attacks    Assessment:  Shantel Mcallister is responding to treatment, symptoms are stable. She denies any new medical issues. Has stressors- father has basal cell cancer on leg and has memory issues and lost way. She is worried about him. Today she reported she is sleeping fitful. Reported she is taking lorazepam prn only. Reported has anxiety related to father's illness. She enjoys her job. Reported her mood is fair and mildly sad related to holidays. Has fair appetite is good and sometimes eating more due to stressors. has interest, has motivation and able to focus and concentrate. She denied any hopelessness or helplessness or passive suicide thoughts. Denied any symptoms of nora or psychosis in past few years. Reported takes lorazepam for panic attacks only. Plan to continue  sertraline to target anxiety. Reviewed labs. Patient denies SI/HI/SIB. No evidence of AH/VH or delusions. Client is responding to treatment and is tolerating treatment well. Psychoeducation, medication teaching, co-morbid illness and pertinent health factors to manage care were discussed. Overall, patient is stable at this time and will require ongoing medication management. Possible organic causes contributing are: HT, seasonal allergies, carpel gaurang  Reviewed medical admissions and discussed with the patient. Client is medically stable. Vitals stable  Risk Scoring- chronic illnesses and prescription drug management      Current Outpatient Medications   Medication Sig Dispense Refill    LORazepam (ATIVAN) 1 mg tablet Take 1/2 to 1 whole tablet daily PRN anxiety. 30 Tab 5    lamoTRIgine (LAMICTAL) 100 mg tablet TAKE 1 TABLET BY MOUTH EVERY DAY 90 Tab 1    sertraline (ZOLOFT) 100 mg tablet Take 1.5 Tabs by mouth daily. 135 Tab 1    atenolol (TENORMIN) 50 mg tablet TAKE 2 TABLETS BY MOUTH EVERY DAY  0    L-TYROSINE Take  by mouth daily.       CYANOCOBALAMIN, VITAMIN B-12, (VITAMIN B-12 SL) by SubLINGual route daily.  cholecalciferol, vitamin D3, 2,000 unit tab Take 2,000 Units by mouth daily.  multivitamin (ONE A DAY) tablet Take 1 Tab by mouth daily.  BIOTIN PO Take 1 Tab by mouth daily. Plan:   1. Medications/ Labs: Continue Lamictal 100mg every day for mood stability. Continue Zoloft 150mg every day for depression. Continue Ativan 0.5-1mg every day PRN severe anxiety- gave 30 tabs. She agrees to use this sparingly. Risks/ benefits reviewed with her. Rxs provided. 2.  Counseling and coordination of care including instructions for treatment, risks/benefits, risk factor reduction and patient/family education. She agrees with the plan. Patient instructed to call with any side effects, questions or issues. PSYCHOTHERAPY:  approx 16 minutes  Type:  Supportive/Cognitive Behavioral psychotherapy provided  Focus:     Current problems- father is dementia   Medical issues- HT     Education : Exercise- walk 20 minutes daily                   obesity,                    Psychoeducation provided  Treatment plan reviewed with patient-including diagnosis and medications    3. Pt was told about my departure from this practice. Client would like to look for psychiatrist in community. Gave a list of providers in the community. Follow-up and Dispositions    · Return in about 3 months (around 4/7/2020) for med check and follow up.        1/7/2020  Angie Soni NP

## 2024-11-08 ENCOUNTER — TELEPHONE (OUTPATIENT)
Age: 69
End: 2024-11-08

## 2024-11-08 NOTE — TELEPHONE ENCOUNTER
----- Message from Adalberto BARNES sent at 11/8/2024  3:13 PM EST -----  Regarding: [facility] Appointment Request  [facility] Appointment Request    Patient needs appointment for [facility] Appointment Type: New to Provider.    Patient Requested Dates(s):Monday, Tuesday and Wednesday  Patient Requested Time:anytime  Provider Name:Indiana Rivers MD    Reason for Appointment Request: Established Patient - No appointments available during search  --------------------------------------------------------------------------------------------------------------------------    Relationship to Patient: Debibe Gomes (Roommate)     Call Back Information: OK to leave message on voicemail  Preferred Call Back Number: Phone [phone]